# Patient Record
Sex: FEMALE | Race: OTHER | HISPANIC OR LATINO
[De-identification: names, ages, dates, MRNs, and addresses within clinical notes are randomized per-mention and may not be internally consistent; named-entity substitution may affect disease eponyms.]

---

## 2018-06-05 ENCOUNTER — RESULT REVIEW (OUTPATIENT)
Age: 58
End: 2018-06-05

## 2020-04-26 ENCOUNTER — MESSAGE (OUTPATIENT)
Age: 60
End: 2020-04-26

## 2020-05-06 LAB
SARS-COV-2 IGG SERPL IA-ACNC: 1 RATIO
SARS-COV-2 IGG SERPL QL IA: ABNORMAL

## 2022-02-11 ENCOUNTER — RESULT REVIEW (OUTPATIENT)
Age: 62
End: 2022-02-11

## 2022-03-25 ENCOUNTER — APPOINTMENT (OUTPATIENT)
Dept: VASCULAR SURGERY | Facility: CLINIC | Age: 62
End: 2022-03-25

## 2022-08-29 ENCOUNTER — RESULT REVIEW (OUTPATIENT)
Age: 62
End: 2022-08-29

## 2022-10-14 ENCOUNTER — TRANSCRIPTION ENCOUNTER (OUTPATIENT)
Age: 62
End: 2022-10-14

## 2022-10-19 PROBLEM — Z00.00 ENCOUNTER FOR PREVENTIVE HEALTH EXAMINATION: Status: ACTIVE | Noted: 2022-10-19

## 2022-11-07 ENCOUNTER — APPOINTMENT (OUTPATIENT)
Dept: VASCULAR SURGERY | Facility: CLINIC | Age: 62
End: 2022-11-07

## 2024-03-11 ENCOUNTER — APPOINTMENT (OUTPATIENT)
Dept: VASCULAR SURGERY | Facility: CLINIC | Age: 64
End: 2024-03-11
Payer: COMMERCIAL

## 2024-03-11 ENCOUNTER — NON-APPOINTMENT (OUTPATIENT)
Age: 64
End: 2024-03-11

## 2024-03-11 VITALS — WEIGHT: 160 LBS | HEIGHT: 57.48 IN | BODY MASS INDEX: 34.04 KG/M2

## 2024-03-11 VITALS — SYSTOLIC BLOOD PRESSURE: 129 MMHG | HEART RATE: 91 BPM | DIASTOLIC BLOOD PRESSURE: 79 MMHG

## 2024-03-11 DIAGNOSIS — K21.9 GASTRO-ESOPHAGEAL REFLUX DISEASE W/OUT ESOPHAGITIS: ICD-10-CM

## 2024-03-11 DIAGNOSIS — I10 ESSENTIAL (PRIMARY) HYPERTENSION: ICD-10-CM

## 2024-03-11 DIAGNOSIS — I83.93 ASYMPTOMATIC VARICOSE VEINS OF BILATERAL LOWER EXTREMITIES: ICD-10-CM

## 2024-03-11 DIAGNOSIS — I87.2 VENOUS INSUFFICIENCY (CHRONIC) (PERIPHERAL): ICD-10-CM

## 2024-03-11 PROCEDURE — 93970 EXTREMITY STUDY: CPT

## 2024-03-11 PROCEDURE — 99243 OFF/OP CNSLTJ NEW/EST LOW 30: CPT

## 2024-03-11 RX ORDER — RAMIPRIL 5 MG/1
CAPSULE ORAL
Refills: 0 | Status: ACTIVE | COMMUNITY

## 2024-03-11 RX ORDER — OMEPRAZOLE 40 MG/1
CAPSULE, DELAYED RELEASE ORAL
Refills: 0 | Status: ACTIVE | COMMUNITY

## 2024-03-11 NOTE — PHYSICAL EXAM
[Normal Breath Sounds] : Normal breath sounds [Alert] : alert [Oriented to Person] : oriented to person [Oriented to Place] : oriented to place [Oriented to Time] : oriented to time [2+] : left 2+ [Ankle Swelling Bilaterally] : bilaterally  [JVD] : no jugular venous distention  [Ankle Swelling (On Exam)] : not present [] : not present [Varicose Veins Of Lower Extremities] : not present [de-identified] : Awake and Alert. [de-identified] : bl lateral legs with spider veins [de-identified] : Appropriate affect. [de-identified] : No gross motor or sensory deficits.

## 2024-03-11 NOTE — END OF VISIT
[FreeTextEntry3] : All medical record entries made by the Jostinibe were at my, LEONARDA ROSAS, direction and personally dictated by me on 03/11/2024. I have reviewed the chart and agree that the record accurately reflects my personal performance of the history, physical exam, assessment and plan. I have also personally directed, reviewed, and agreed with the chart.

## 2024-03-11 NOTE — REVIEW OF SYSTEMS
[Limb Pain] : limb pain [Fever] : no fever [Chills] : no chills [Lower Ext Edema] : no lower extremity edema [Limb Swelling] : no limb swelling

## 2024-03-11 NOTE — DATA REVIEWED
[FreeTextEntry1] : Bilateral lower extremity venous duplex - personally reviewed - No DVT/SVT, clinically insignificant reflux.

## 2024-03-11 NOTE — HISTORY OF PRESENT ILLNESS
[FreeTextEntry1] : 62 yo female presents for an initial evaluation of symptomatic bilateral lower extremity spider veins. The patient reports burning and pain associated with the veins. 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 She reports occasional lower extremity edema. She currently does not wear compression stockings to manage her symptoms. The patient denies a history of DVT or SVT. She does not currently wear compression stockings.  She has a history of HTN, managed with medication.

## 2024-03-11 NOTE — ASSESSMENT
[FreeTextEntry1] : 62 yo female with bilateral lower extremity symptomatic spider veins. The patient underwent a bilateral lower extremity venous duplex that was negative for DVT and clinically significant superficial venous reflux. We discussed sclerotherapy treatment of her spider vein. Risks and benefits of the procedure including, infection, bleeding, and DVT. she was given a pamphlet with more information about the procedure. The patient would like to proceed and will be scheduled at her convenience.  She was instructed to bring compression stockings to the appointment.

## 2024-03-15 PROBLEM — I83.93 SPIDER VEINS OF BOTH LOWER EXTREMITIES: Status: ACTIVE | Noted: 2024-03-11

## 2024-03-15 PROBLEM — I87.2 VENOUS INSUFFICIENCY OF BOTH LOWER EXTREMITIES: Status: ACTIVE | Noted: 2024-03-11
